# Patient Record
Sex: FEMALE | Race: WHITE | NOT HISPANIC OR LATINO | Employment: STUDENT | URBAN - METROPOLITAN AREA
[De-identification: names, ages, dates, MRNs, and addresses within clinical notes are randomized per-mention and may not be internally consistent; named-entity substitution may affect disease eponyms.]

---
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
## 2025-04-29 ENCOUNTER — APPOINTMENT (EMERGENCY)
Dept: CT IMAGING | Facility: HOSPITAL | Age: 20
End: 2025-04-29

## 2025-04-29 ENCOUNTER — APPOINTMENT (EMERGENCY)
Dept: RADIOLOGY | Facility: HOSPITAL | Age: 20
End: 2025-04-29

## 2025-04-29 ENCOUNTER — HOSPITAL ENCOUNTER (EMERGENCY)
Facility: HOSPITAL | Age: 20
Discharge: HOME | End: 2025-04-29
Attending: EMERGENCY MEDICINE

## 2025-04-29 VITALS
RESPIRATION RATE: 18 BRPM | SYSTOLIC BLOOD PRESSURE: 107 MMHG | DIASTOLIC BLOOD PRESSURE: 56 MMHG | HEART RATE: 88 BPM | WEIGHT: 140.21 LBS | OXYGEN SATURATION: 94 % | TEMPERATURE: 98 F

## 2025-04-29 DIAGNOSIS — S09.90XA INJURY OF HEAD, INITIAL ENCOUNTER: ICD-10-CM

## 2025-04-29 DIAGNOSIS — E87.6 HYPOKALEMIA: ICD-10-CM

## 2025-04-29 DIAGNOSIS — F10.929 ACUTE ALCOHOL INTOXICATION (HCC): ICD-10-CM
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
## 2025-04-29 LAB
ALBUMIN SERPL BCG-MCNC: 4.2 G/DL (ref 3.5–5)
ALP SERPL-CCNC: 43 U/L (ref 34–104)
ALT SERPL W P-5'-P-CCNC: 9 U/L (ref 7–52)
AMPHETAMINES SERPL QL SCN: NEGATIVE
ANION GAP SERPL CALCULATED.3IONS-SCNC: 12 MMOL/L (ref 4–13)
APAP SERPL-MCNC: <2 UG/ML (ref 10–20)
APTT PPP: 25 SECONDS (ref 23–34)
AST SERPL W P-5'-P-CCNC: 16 U/L (ref 13–39)
BARBITURATES UR QL: NEGATIVE
BASOPHILS # BLD AUTO: 0.04 THOUSANDS/ÂΜL (ref 0–0.1)
BASOPHILS NFR BLD AUTO: 1 % (ref 0–1)
BENZODIAZ UR QL: NEGATIVE
BILIRUB SERPL-MCNC: 0.26 MG/DL (ref 0.2–1)
BUN SERPL-MCNC: 10 MG/DL (ref 5–25)
CALCIUM SERPL-MCNC: 8.5 MG/DL (ref 8.4–10.2)
CHLORIDE SERPL-SCNC: 101 MMOL/L (ref 96–108)
CO2 SERPL-SCNC: 22 MMOL/L (ref 21–32)
COCAINE UR QL: NEGATIVE
CREAT SERPL-MCNC: 0.53 MG/DL (ref 0.6–1.3)
EOSINOPHIL # BLD AUTO: 0.14 THOUSAND/ÂΜL (ref 0–0.61)
EOSINOPHIL NFR BLD AUTO: 2 % (ref 0–6)
ERYTHROCYTE [DISTWIDTH] IN BLOOD BY AUTOMATED COUNT: 13.1 % (ref 11.6–15.1)
ETHANOL SERPL-MCNC: 312 MG/DL
FENTANYL UR QL SCN: NEGATIVE
GFR SERPL CREATININE-BSD FRML MDRD: 135 ML/MIN/1.73SQ M
GLUCOSE SERPL-MCNC: 143 MG/DL (ref 65–140)
GLUCOSE SERPL-MCNC: 151 MG/DL (ref 65–140)
HCG SERPL QL: NEGATIVE
HCT VFR BLD AUTO: 33.2 % (ref 34.8–46.1)
HGB BLD-MCNC: 11 G/DL (ref 11.5–15.4)
HYDROCODONE UR QL SCN: NEGATIVE
IMM GRANULOCYTES # BLD AUTO: 0.04 THOUSAND/UL (ref 0–0.2)
IMM GRANULOCYTES NFR BLD AUTO: 1 % (ref 0–2)
INR PPP: 1.07 (ref 0.85–1.19)
LYMPHOCYTES # BLD AUTO: 3.71 THOUSANDS/ÂΜL (ref 0.6–4.47)
LYMPHOCYTES NFR BLD AUTO: 47 % (ref 14–44)
MAGNESIUM SERPL-MCNC: 2 MG/DL (ref 1.9–2.7)
MCH RBC QN AUTO: 27.6 PG (ref 26.8–34.3)
MCHC RBC AUTO-ENTMCNC: 33.1 G/DL (ref 31.4–37.4)
MCV RBC AUTO: 83 FL (ref 82–98)
METHADONE UR QL: NEGATIVE
MONOCYTES # BLD AUTO: 0.49 THOUSAND/ÂΜL (ref 0.17–1.22)
MONOCYTES NFR BLD AUTO: 6 % (ref 4–12)
NEUTROPHILS # BLD AUTO: 3.28 THOUSANDS/ÂΜL (ref 1.85–7.62)
NEUTS SEG NFR BLD AUTO: 43 % (ref 43–75)
NRBC BLD AUTO-RTO: 0 /100 WBCS
OPIATES UR QL SCN: NEGATIVE
OXYCODONE+OXYMORPHONE UR QL SCN: NEGATIVE
PCP UR QL: NEGATIVE
PLATELET # BLD AUTO: 247 THOUSANDS/UL (ref 149–390)
PMV BLD AUTO: 9.6 FL (ref 8.9–12.7)
POTASSIUM SERPL-SCNC: 3.2 MMOL/L (ref 3.5–5.3)
PROT SERPL-MCNC: 6.9 G/DL (ref 6.4–8.4)
PROTHROMBIN TIME: 14.3 SECONDS (ref 12.3–15)
RBC # BLD AUTO: 3.98 MILLION/UL (ref 3.81–5.12)
SALICYLATES SERPL-MCNC: <5 MG/DL (ref 3–20)
SODIUM SERPL-SCNC: 135 MMOL/L (ref 135–147)
THC UR QL: NEGATIVE
WBC # BLD AUTO: 7.7 THOUSAND/UL (ref 4.31–10.16)

## 2025-04-29 PROCEDURE — 83735 ASSAY OF MAGNESIUM: CPT | Performed by: EMERGENCY MEDICINE

## 2025-04-29 PROCEDURE — 85610 PROTHROMBIN TIME: CPT | Performed by: EMERGENCY MEDICINE

## 2025-04-29 PROCEDURE — 90715 TDAP VACCINE 7 YRS/> IM: CPT | Performed by: EMERGENCY MEDICINE

## 2025-04-29 PROCEDURE — 85025 COMPLETE CBC W/AUTO DIFF WBC: CPT | Performed by: EMERGENCY MEDICINE

## 2025-04-29 PROCEDURE — 72125 CT NECK SPINE W/O DYE: CPT

## 2025-04-29 PROCEDURE — 96361 HYDRATE IV INFUSION ADD-ON: CPT

## 2025-04-29 PROCEDURE — 90471 IMMUNIZATION ADMIN: CPT

## 2025-04-29 PROCEDURE — 80307 DRUG TEST PRSMV CHEM ANLYZR: CPT | Performed by: EMERGENCY MEDICINE

## 2025-04-29 PROCEDURE — 84703 CHORIONIC GONADOTROPIN ASSAY: CPT | Performed by: EMERGENCY MEDICINE

## 2025-04-29 PROCEDURE — 82948 REAGENT STRIP/BLOOD GLUCOSE: CPT

## 2025-04-29 PROCEDURE — 93005 ELECTROCARDIOGRAM TRACING: CPT

## 2025-04-29 PROCEDURE — 80143 DRUG ASSAY ACETAMINOPHEN: CPT | Performed by: EMERGENCY MEDICINE

## 2025-04-29 PROCEDURE — 96366 THER/PROPH/DIAG IV INF ADDON: CPT

## 2025-04-29 PROCEDURE — 99285 EMERGENCY DEPT VISIT HI MDM: CPT | Performed by: EMERGENCY MEDICINE

## 2025-04-29 PROCEDURE — 96365 THER/PROPH/DIAG IV INF INIT: CPT

## 2025-04-29 PROCEDURE — 96375 TX/PRO/DX INJ NEW DRUG ADDON: CPT

## 2025-04-29 PROCEDURE — 36415 COLL VENOUS BLD VENIPUNCTURE: CPT | Performed by: EMERGENCY MEDICINE

## 2025-04-29 PROCEDURE — 80053 COMPREHEN METABOLIC PANEL: CPT | Performed by: EMERGENCY MEDICINE

## 2025-04-29 PROCEDURE — 85730 THROMBOPLASTIN TIME PARTIAL: CPT | Performed by: EMERGENCY MEDICINE

## 2025-04-29 PROCEDURE — 99285 EMERGENCY DEPT VISIT HI MDM: CPT

## 2025-04-29 PROCEDURE — 71045 X-RAY EXAM CHEST 1 VIEW: CPT

## 2025-04-29 PROCEDURE — 70450 CT HEAD/BRAIN W/O DYE: CPT

## 2025-04-29 PROCEDURE — 80179 DRUG ASSAY SALICYLATE: CPT | Performed by: EMERGENCY MEDICINE

## 2025-04-29 PROCEDURE — 82077 ASSAY SPEC XCP UR&BREATH IA: CPT | Performed by: EMERGENCY MEDICINE

## 2025-04-29 RX ORDER — ONDANSETRON 2 MG/ML
4 INJECTION INTRAMUSCULAR; INTRAVENOUS ONCE
Status: DISCONTINUED | OUTPATIENT
Start: 2025-04-29 | End: 2025-04-29

## 2025-04-29 RX ORDER — SODIUM CHLORIDE 9 MG/ML
100 INJECTION, SOLUTION INTRAVENOUS CONTINUOUS
Status: DISCONTINUED | OUTPATIENT
Start: 2025-04-29 | End: 2025-04-29 | Stop reason: HOSPADM

## 2025-04-29 RX ORDER — POTASSIUM CHLORIDE 29.8 MG/ML
40 INJECTION INTRAVENOUS ONCE
Status: DISCONTINUED | OUTPATIENT
Start: 2025-04-29 | End: 2025-04-29

## 2025-04-29 RX ORDER — DROPERIDOL 2.5 MG/ML
1 INJECTION, SOLUTION INTRAMUSCULAR; INTRAVENOUS ONCE
Status: COMPLETED | OUTPATIENT
Start: 2025-04-29 | End: 2025-04-29

## 2025-04-29 RX ORDER — ONDANSETRON 2 MG/ML
2 INJECTION INTRAMUSCULAR; INTRAVENOUS ONCE
Status: COMPLETED | OUTPATIENT
Start: 2025-04-29 | End: 2025-04-29

## 2025-04-29 RX ORDER — POTASSIUM CHLORIDE 14.9 MG/ML
20 INJECTION INTRAVENOUS
Status: COMPLETED | OUTPATIENT
Start: 2025-04-29 | End: 2025-04-29

## 2025-04-29 RX ADMIN — ONDANSETRON 2 MG: 2 INJECTION INTRAMUSCULAR; INTRAVENOUS at 01:36

## 2025-04-29 RX ADMIN — TETANUS TOXOID, REDUCED DIPHTHERIA TOXOID AND ACELLULAR PERTUSSIS VACCINE, ADSORBED 0.5 ML: 5; 2.5; 8; 8; 2.5 SUSPENSION INTRAMUSCULAR at 01:52

## 2025-04-29 RX ADMIN — POTASSIUM CHLORIDE 20 MEQ: 14.9 INJECTION, SOLUTION INTRAVENOUS at 03:38

## 2025-04-29 RX ADMIN — SODIUM CHLORIDE 1000 ML: 0.9 INJECTION, SOLUTION INTRAVENOUS at 00:48

## 2025-04-29 RX ADMIN — POTASSIUM CHLORIDE 20 MEQ: 14.9 INJECTION, SOLUTION INTRAVENOUS at 01:50

## 2025-04-29 RX ADMIN — SODIUM CHLORIDE 100 ML/HR: 0.9 INJECTION, SOLUTION INTRAVENOUS at 01:49

## 2025-04-29 NOTE — Clinical Note
Maureen Alexander was seen and treated in our emergency department on 4/29/2025.                Diagnosis:     Maureen  may return to school on return date.    She may return on this date: 04/30/2025         If you have any questions or concerns, please don't hesitate to call.      Dipak Escobar MD    ______________________________           _______________          _______________  Hospital Representative                              Date                                Time

## 2025-04-29 NOTE — DISCHARGE INSTRUCTIONS
Follow up with your primary doctor/outpatient providers, and return to the emergency department for new or worsening symptoms.      CT BRAIN - WITHOUT CONTRAST     INDICATION:   fall, head injury.     COMPARISON:  None.     TECHNIQUE:  CT examination of the brain was performed.  Multiplanar 2D reformatted images were created from the source data.     Radiation dose length product (DLP) for this visit:  775.5 mGy-cm. , 183.2 mGy-cm. .  This examination, like all CT scans performed in the [facility], was performed utilizing techniques to minimize radiation dose exposure, including the   use of iterative reconstruction and automated exposure control.     IMAGE QUALITY:  Diagnostic.     FINDINGS:     PARENCHYMA:No intracranial mass, mass effect or midline shift. No CT signs of acute territorial infarction.  No acute parenchymal hemorrhage. Gray-white differentiation appears maintained.     VENTRICLES AND EXTRA-AXIAL SPACES:  Normal for the patient's age.     VISUALIZED ORBITS:  Normal visualized orbits.     PARANASAL SINUSES:  Normal visualized paranasal sinuses.     CALVARIUM AND EXTRACRANIAL SOFT TISSUES:  Small frontal scalp hematoma. Calvarium appears intact.        IMPRESSION:     Small frontal scalp hematoma but no calvarial fracture or acute intracranial abnormality is seen.           CT CERVICAL SPINE - WITHOUT CONTRAST     INDICATION:   fall, head injury.     COMPARISON:  None.     TECHNIQUE:  CT examination of the cervical spine was performed without intravenous contrast.  Contiguous axial images were obtained. Multiplanar 2D reformatted images were created from the source data.     Radiation dose length product (DLP) for this visit:  775.5 mGy-cm. .  This examination, like all CT scans performed in the [facility], was performed utilizing techniques to minimize radiation dose exposure, including the use of iterative   reconstruction and automated exposure control.     IMAGE  QUALITY:  Diagnostic.     FINDINGS:     ALIGNMENT:  Normal alignment of the cervical spine. No subluxation.     VERTEBRAE:  No acute fracture.     DEGENERATIVE CHANGES:  No significant cervical degenerative changes are noted.     PREVERTEBRAL AND PARASPINAL SOFT TISSUES: Unremarkable     THORACIC INLET:  Normal.        IMPRESSION:     No evidence of acute cervical spine injury.

## 2025-04-29 NOTE — ED NOTES
Pt O2 87 % on RA. Pt placed on 2 L NC. O2 now 95%. Provider aware. Care ongoing.      Cami Hill RN  04/29/25 0056

## 2025-04-29 NOTE — ED NOTES
Pt arrives EMS in C-collar. Pt opens eyes to voice, but not following commands. R pupil 2 round sluggish, L pupil 3 round brisk. Patient noted with contusion above R eyebrow.  Pt noted to start dry heaving, sat patient up and vomited a small amount into vomit bag. Pt 02 saturation 87% RA. Placed patient on 2L NC. Lung sounds Clear. NSR noted on cardiac monitor. Belly is soft, non tender, Bwoel sounds normoactive. PVS wnl.      Sarah Mcdermott V RN  04/29/25 0059

## 2025-04-29 NOTE — ED PROVIDER NOTES
Emergency Department Trauma Note  Maureen Alexander 20 y.o. female MRN: [id number]  Unit/Bed#: ED 09/ED 09 Encounter: [id number]      Trauma Alert: Trauma Acuity: Trauma Evaluation  Model of Arrival: Mode of Arrival: BLS via Trauma Squad Name and Number: [facility]  Trauma Team: Current Providers  Attending Provider: Dipak Escobar MD  ED Technician: Elisa Pedersen  Registered Nurse: Cami Hill RN  ED Technician: Rodrigo Lilly  Consultants:     None      History of Present Illness     Chief Complaint:   Chief Complaint   Patient presents with    Alcohol Intoxication     Pts roomates called ems due to patient falling out of her 3ft bunk, contusion noted on forehead. Per EMS patient had beers, shots, and wine tonight. C-collar in place. Gave 1.25 Droperidol for dry heaving     HPI:  Maureen Alexander is a 20 y.o. female who presents with apparent alcohol intoxication, head injury.  Mechanism:Details of Incident: per pt roomates, pt fell 3 feet off of her bed and hit her head on tile cristobal Injury Date: 04/29/25        Patient is a 20-year-old female seen in the emergency department brought by EMS with concern for apparent alcohol intoxication with fall/head injury.  Per EMS, patient was found by door meets after a fall from bed, apparently while sleeping.  Per friends, patient was drinking alcoholic beverages this evening.  EMS explains that patient was treated with droperidol 1.25 mg IV prior to evaluation in the emergency department due to dry heaving.  Patient is somnolent on evaluation, unable to provide any additional coherent history.  A trauma alert was called in the emergency department.      Review of Systems   Unable to perform ROS: Mental status change       Historical Information     Immunizations:   Immunization History   Administered Date(s) Administered    Tdap 04/29/2025       No past medical history on file.  No family history on file.  No past surgical history on file.  Social History      Tobacco Use    Smoking status: Never    Smokeless tobacco: Never   Substance Use Topics    Alcohol use: Yes    Drug use: Never     E-Cigarette/Vaping     E-Cigarette/Vaping Substances       Family History: non-contributory    Meds/Allergies   Prior to Admission Medications   Prescriptions Last Dose Informant Patient Reported? Taking?   ondansetron (ZOFRAN-ODT) 4 mg disintegrating tablet   No No   Sig: Take 1 tablet (4 mg total) by mouth every 6 (six) hours as needed for nausea or vomiting   tamsulosin (FLOMAX) 0.4 mg   No No   Sig: Take 1 capsule (0.4 mg total) by mouth daily with dinner for 10 days      Facility-Administered Medications: None       No Known Allergies    PHYSICAL EXAM    PE limited by: not applicable    Objective   Vitals:   First set: Temperature: (!) 97 °F (36.1 °C) (04/29/25 0031)  Pulse: 85 (04/29/25 0031)  Respirations: 18 (04/29/25 0031)  Blood Pressure: 134/92 (04/29/25 0031)  SpO2: 96 % (04/29/25 0031)    Primary Survey:   (A) Airway: patent  (B) Breathing: lungs clear to auscultation bilaterally  (C) Circulation: Pulses:   normal  (D) Disabliity:  GCS Total:  8, Eye Opening:   To pain = 2, Motor Response: Withdraws to pain = 4, and Verbal Response:  Incomprehensible sounds = 2  (E) Expose:  Completed    Secondary Survey: (Click on Physical Exam tab above)  Physical Exam  Vitals and nursing note reviewed.   Constitutional:       Appearance: She is well-developed. She is ill-appearing.   HENT:      Head: Normocephalic.      Comments: Frontal contusion/abrasion     Right Ear: External ear normal.      Left Ear: External ear normal.      Nose: Nose normal.      Mouth/Throat:      Pharynx: Oropharynx is clear.   Eyes:      General: No scleral icterus.     Conjunctiva/sclera: Conjunctivae normal.   Neck:      Comments: Cervical spine collar in place; no step-offs noted  Cardiovascular:      Rate and Rhythm: Normal rate and regular rhythm.      Heart sounds: No murmur heard.  Pulmonary:       Effort: Pulmonary effort is normal. No respiratory distress.      Breath sounds: Normal breath sounds.   Abdominal:      General: There is no distension.      Palpations: Abdomen is soft.      Tenderness: There is no abdominal tenderness.   Musculoskeletal:         General: No tenderness or deformity.   Skin:     General: Skin is warm and dry.   Neurological:      Mental Status: She is alert.      Comments: Somnolent, minimally arousable to voice/sternal rub   Psychiatric:      Comments: Intoxicated         Cervical spine cleared by clinical criteria? No (imaging required)      Invasive Devices       Peripheral Intravenous Line  Duration             Peripheral IV 04/29/25 Left Antecubital <1 day                    Lab Results:   Results Reviewed       Procedure Component Value Units Date/Time    Rapid drug screen, urine [[id number]] Collected: 04/29/25 0334    Lab Status: In process Specimen: Urine, Clean Catch Updated: 04/29/25 0337    Salicylate level [[id number]]  (Normal) Collected: 04/29/25 0034    Lab Status: Final result Specimen: Blood from Arm, Left Updated: 04/29/25 0123     Salicylate Lvl <5 mg/dL     Acetaminophen level-If concentration is detectable, please discuss with medical  on call. [[id number]]  (Abnormal) Collected: 04/29/25 0034    Lab Status: Final result Specimen: Blood from Arm, Left Updated: 04/29/25 0123     Acetaminophen Level <2 ug/mL     APTT [[id number]]  (Normal) Collected: 04/29/25 0034    Lab Status: Final result Specimen: Blood from Arm, Left Updated: 04/29/25 0119     PTT 25 seconds     Protime-INR [[id number]]  (Normal) Collected: 04/29/25 0034    Lab Status: Final result Specimen: Blood from Arm, Left Updated: 04/29/25 0119     Protime 14.3 seconds      INR 1.07    Narrative:      INR Therapeutic Range    Indication                                             INR Range      Atrial Fibrillation                                               2.0-3.0  Hypercoagulable State                                     2.0.2.3  Left Ventricular Asist Device                            2.0-3.0  Mechanical Heart Valve                                  -    Aortic(with afib, MI, embolism, HF, LA enlargement,    and/or coagulopathy)                                     2.0-3.0 (2.5-3.5)     Mitral                                                             2.5-3.5  Prosthetic/Bioprosthetic Heart Valve               2.0-3.0  Venous thromboembolism (VTE: VT, PE        2.0-3.0    hCG, qualitative pregnancy [[id number]]  (Normal) Collected: 04/29/25 0034    Lab Status: Final result Specimen: Blood from Arm, Left Updated: 04/29/25 0112     Preg, Serum Negative    Comprehensive metabolic panel [[id number]]  (Abnormal) Collected: 04/29/25 0034    Lab Status: Final result Specimen: Blood from Arm, Left Updated: 04/29/25 0106     Sodium 135 mmol/L      Potassium 3.2 mmol/L      Chloride 101 mmol/L      CO2 22 mmol/L      ANION GAP 12 mmol/L      BUN 10 mg/dL      Creatinine 0.53 mg/dL      Glucose 151 mg/dL      Calcium 8.5 mg/dL      AST 16 U/L      ALT 9 U/L      Alkaline Phosphatase 43 U/L      Total Protein 6.9 g/dL      Albumin 4.2 g/dL      Total Bilirubin 0.26 mg/dL      eGFR 135 ml/min/1.73sq m     Narrative:      [employer] guidelines for Chronic Kidney Disease (CKD):     Stage 1 with normal or high GFR (GFR > 90 mL/min/1.73 square meters)    Stage 2 Mild CKD (GFR = 60-89 mL/min/1.73 square meters)    Stage 3A Moderate CKD (GFR = 45-59 mL/min/1.73 square meters)    Stage 3B Moderate CKD (GFR = 30-44 mL/min/1.73 square meters)    Stage 4 Severe CKD (GFR = 15-29 mL/min/1.73 square meters)    Stage 5 End Stage CKD (GFR <15 mL/min/1.73 square meters)  Note: GFR calculation is accurate only with a steady state creatinine    Magnesium [[id number]]  (Normal) Collected: 04/29/25 0034    Lab Status: Final result Specimen: Blood from Arm, Left Updated: 04/29/25 0106     Magnesium 2.0 mg/dL      Ethanol [[id number]]  (Abnormal) Collected: 04/29/25 0034    Lab Status: Final result Specimen: Blood from Arm, Left Updated: 04/29/25 0106     Ethanol Lvl 312 mg/dL     CBC and differential [[id number]]  (Abnormal) Collected: 04/29/25 0034    Lab Status: Final result Specimen: Blood from Arm, Left Updated: 04/29/25 0047     WBC 7.70 Thousand/uL      RBC 3.98 Million/uL      Hemoglobin 11.0 g/dL      Hematocrit 33.2 %      MCV 83 fL      MCH 27.6 pg      MCHC 33.1 g/dL      RDW 13.1 %      MPV 9.6 fL      Platelets 247 Thousands/uL      nRBC 0 /100 WBCs      Segmented % 43 %      Immature Grans % 1 %      Lymphocytes % 47 %      Monocytes % 6 %      Eosinophils Relative 2 %      Basophils Relative 1 %      Absolute Neutrophils 3.28 Thousands/µL      Absolute Immature Grans 0.04 Thousand/uL      Absolute Lymphocytes 3.71 Thousands/µL      Absolute Monocytes 0.49 Thousand/µL      Eosinophils Absolute 0.14 Thousand/µL      Basophils Absolute 0.04 Thousands/µL     Fingerstick Glucose (POCT) [[id number]]  (Abnormal) Collected: 04/29/25 0035    Lab Status: Final result Specimen: Blood Updated: 04/29/25 0036     POC Glucose 143 mg/dl                    Imaging Studies:   Direct to CT: Yes  XR chest 1 view portable   ED Interpretation by Dipak Escobar MD (04/29 0058)   No infiltrate or pneumothorax      Final Result by Selvin Kan DO (04/29 0111)      No acute cardiopulmonary disease is seen.                  Workstation performed: [id number]         CT head wo contrast   Final Result by Selvin Kan DO (04/29 0110)      Small frontal scalp hematoma but no calvarial fracture or acute intracranial abnormality is seen.            CT CERVICAL SPINE - WITHOUT CONTRAST      INDICATION:   fall, head injury.      COMPARISON:  None.      TECHNIQUE:  CT examination of the cervical spine was performed without intravenous contrast.  Contiguous axial images were obtained. Multiplanar 2D reformatted images were  created from the source data.      Radiation dose length product (DLP) for this visit:  775.5 mGy-cm. .  This examination, like all CT scans performed in the [facility], was performed utilizing techniques to minimize radiation dose exposure, including the use of iterative    reconstruction and automated exposure control.      IMAGE QUALITY:  Diagnostic.      FINDINGS:      ALIGNMENT:  Normal alignment of the cervical spine. No subluxation.      VERTEBRAE:  No acute fracture.      DEGENERATIVE CHANGES:  No significant cervical degenerative changes are noted.      PREVERTEBRAL AND PARASPINAL SOFT TISSUES: Unremarkable      THORACIC INLET:  Normal.         IMPRESSION:      No evidence of acute cervical spine injury.            Workstation performed: [id number]         CT spine cervical without contrast   Final Result by Selvin Kan DO (04/29 0110)      Small frontal scalp hematoma but no calvarial fracture or acute intracranial abnormality is seen.            CT CERVICAL SPINE - WITHOUT CONTRAST      INDICATION:   fall, head injury.      COMPARISON:  None.      TECHNIQUE:  CT examination of the cervical spine was performed without intravenous contrast.  Contiguous axial images were obtained. Multiplanar 2D reformatted images were created from the source data.      Radiation dose length product (DLP) for this visit:  775.5 mGy-cm. .  This examination, like all CT scans performed in the [facility], was performed utilizing techniques to minimize radiation dose exposure, including the use of iterative    reconstruction and automated exposure control.      IMAGE QUALITY:  Diagnostic.      FINDINGS:      ALIGNMENT:  Normal alignment of the cervical spine. No subluxation.      VERTEBRAE:  No acute fracture.      DEGENERATIVE CHANGES:  No significant cervical degenerative changes are noted.      PREVERTEBRAL AND PARASPINAL SOFT TISSUES: Unremarkable      THORACIC INLET:  Normal.          IMPRESSION:      No evidence of acute cervical spine injury.            Workstation performed: [id number]               Procedures  ECG 12 Lead Documentation Only    Date/Time: 4/29/2025 12:38 AM    Performed by: Dipak Escobar MD  Authorized by: Dipak Escobar MD    Indications / Diagnosis:  Altered mental status  ECG reviewed by me, the ED Provider: yes    Patient location:  ED  Rate:     ECG rate:  101    ECG rate assessment: tachycardic    Rhythm:     Rhythm: sinus tachycardia    QRS:     QRS axis:  Normal  ST segments:     ST segments:  Normal  T waves:     T waves: normal    Comments:      Sinus tachycardia at 101, normal axis, , QRS 94, QTc 500, no ST-T wave abnormality, no evidence of acute ischemia  ECG 12 Lead Documentation Only    Date/Time: 4/29/2025 3:48 AM    Performed by: Dipak Escobar MD  Authorized by: Dipak Escobar MD    Indications / Diagnosis:  Intoxication  ECG reviewed by me, the ED Provider: yes    Patient location:  ED  Rate:     ECG rate:  102    ECG rate assessment: tachycardic    Rhythm:     Rhythm: sinus tachycardia    QRS:     QRS axis:  Normal  ST segments:     ST segments:  Normal  T waves:     T waves: normal    Comments:      Sinus tachycardia at 102, normal axis, , QRS 92, QTc 477, no ST-T wave abnormality, no evidence of acute ischemia           ED Course  ED Course as of 04/29/25 0539   Tue Apr 29, 2025   0112 CT BRAIN - WITHOUT CONTRAST     INDICATION:   fall, head injury.     COMPARISON:  None.     TECHNIQUE:  CT examination of the brain was performed.  Multiplanar 2D reformatted images were created from the source data.     Radiation dose length product (DLP) for this visit:  775.5 mGy-cm. , 183.2 mGy-cm. .  This examination, like all CT scans performed in the [facility], was performed utilizing techniques to minimize radiation dose exposure, including the   use of iterative reconstruction and automated exposure control.     IMAGE  QUALITY:  Diagnostic.     FINDINGS:     PARENCHYMA:No intracranial mass, mass effect or midline shift. No CT signs of acute territorial infarction.  No acute parenchymal hemorrhage. Gray-white differentiation appears maintained.     VENTRICLES AND EXTRA-AXIAL SPACES:  Normal for the patient's age.     VISUALIZED ORBITS:  Normal visualized orbits.     PARANASAL SINUSES:  Normal visualized paranasal sinuses.     CALVARIUM AND EXTRACRANIAL SOFT TISSUES:  Small frontal scalp hematoma. Calvarium appears intact.        IMPRESSION:     Small frontal scalp hematoma but no calvarial fracture or acute intracranial abnormality is seen.        0112 CT CERVICAL SPINE - WITHOUT CONTRAST     INDICATION:   fall, head injury.     COMPARISON:  None.     TECHNIQUE:  CT examination of the cervical spine was performed without intravenous contrast.  Contiguous axial images were obtained. Multiplanar 2D reformatted images were created from the source data.     Radiation dose length product (DLP) for this visit:  775.5 mGy-cm. .  This examination, like all CT scans performed in the [facility], was performed utilizing techniques to minimize radiation dose exposure, including the use of iterative   reconstruction and automated exposure control.     IMAGE QUALITY:  Diagnostic.     FINDINGS:     ALIGNMENT:  Normal alignment of the cervical spine. No subluxation.     VERTEBRAE:  No acute fracture.     DEGENERATIVE CHANGES:  No significant cervical degenerative changes are noted.     PREVERTEBRAL AND PARASPINAL SOFT TISSUES: Unremarkable     THORACIC INLET:  Normal.        IMPRESSION:     No evidence of acute cervical spine injury.        0118 XR CHEST PORTABLE     INDICATION: fall. Head trauma     COMPARISON: None     FINDINGS:     No pneumothorax is seen. The lungs appear grossly clear.     The cardiac and mediastinal contours appear grossly unremarkable.     Visualized bones appear intact.        IMPRESSION:     No acute  cardiopulmonary disease is seen.                Medical Decision Making  Patient is a 20-year-old female seen in the emergency department brought by EMS with concern for apparent intoxication, fall/head injury.  A trauma alert was called in the emergency department.  EKG was obtained and noted, which showed no definite evidence of arrhythmia or ischemia.   Laboratory evaluation remarkable for low potassium of 3.2, elevated glucose of 151, elevated serum ethanol level of 312, low hemoglobin of 11.0, low hematocrit of 33.2. CT head/cervical spine was obtained to evaluate for acute traumatic injury.  Chest x-ray showed no infiltrate or pneumothorax.  CT imaging showed small frontal scalp hematoma, no acute intracranial abnormality or fracture.  Potassium was repleted in the emergency department. Patient was monitored in the emergency department until clinically sober, and stable for discharge.  Discharge instructions were reviewed with patient.    Problems Addressed:  Acute alcohol intoxication (HCC): acute illness or injury  Fall, initial encounter: acute illness or injury  Injury of head, initial encounter: acute illness or injury    Amount and/or Complexity of Data Reviewed  Labs: ordered. Decision-making details documented in ED Course.  Radiology: ordered and independent interpretation performed. Decision-making details documented in ED Course.  ECG/medicine tests: ordered and independent interpretation performed. Decision-making details documented in ED Course.    Risk  Prescription drug management.                Disposition  [facility] Transfer: No  Final diagnoses:   Acute alcohol intoxication (HCC)   Fall, initial encounter   Injury of head, initial encounter   Hypokalemia     Time reflects when diagnosis was documented in both MDM as applicable and the Disposition within this note       Time User Action Codes Description Comment    4/29/2025 12:33 AM Dipak Escobar Add [F10.929] Acute alcohol intoxication  (HCC)     4/29/2025 12:34 AM Dipak Escobar [W19.XXXA] Fall, initial encounter     4/29/2025 12:34 AM Dipak Escobar [S09.90XA] Injury of head, initial encounter     4/29/2025 12:34 AM Dipak Escobar Modify [F10.929] Acute alcohol intoxication (HCC)     4/29/2025  1:18 AM Dipak Escobar [E87.6] Hypokalemia     4/29/2025  1:18 AM Dipak Escobar Modify [E87.6] Hypokalemia           ED Disposition       ED Disposition   Discharge    Condition   Stable    Date/Time   Tue Apr 29, 2025  4:48 AM    Comment   Maureen Alexander discharge to home/self care.                   Follow-up Information       Follow up With Specialties Details Why Contact Info Additional Information    Your primary doctor  Call in 1 day       [facility] Call  As needed [address] [facility] [address] [phone] [facility], [address] [place] [address], [address] [phone]    [facility] Call  As needed [address]  [phone]             Patient's Medications   Discharge Prescriptions    No medications on file         PDMP Review       None            ED Provider  Electronically Signed by           Dipak Escobar MD  04/29/25 0539

## 2025-04-29 NOTE — ED NOTES
Pt O2 97% on 2 L NC. Trending pt O2 down to 1 L NC per provider order. Pt O2 93% on 1 L NC. Care ongoing.      Cami Hill RN  04/29/25 2788

## 2025-05-02 LAB
ATRIAL RATE: 101 BPM
ATRIAL RATE: 102 BPM
P AXIS: 69 DEGREES
P AXIS: 71 DEGREES
PR INTERVAL: 160 MS
PR INTERVAL: 162 MS
QRS AXIS: 65 DEGREES
QRS AXIS: 67 DEGREES
QRSD INTERVAL: 92 MS
QRSD INTERVAL: 94 MS
QT INTERVAL: 366 MS
QT INTERVAL: 386 MS
QTC INTERVAL: 477 MS
QTC INTERVAL: 500 MS
T WAVE AXIS: 51 DEGREES
T WAVE AXIS: 53 DEGREES
VENTRICULAR RATE: 101 BPM
VENTRICULAR RATE: 102 BPM

## 2025-05-02 PROCEDURE — 93010 ELECTROCARDIOGRAM REPORT: CPT | Performed by: INTERNAL MEDICINE
